# Patient Record
Sex: FEMALE | Race: WHITE | NOT HISPANIC OR LATINO | URBAN - METROPOLITAN AREA
[De-identification: names, ages, dates, MRNs, and addresses within clinical notes are randomized per-mention and may not be internally consistent; named-entity substitution may affect disease eponyms.]

---

## 2017-01-06 ENCOUNTER — OFFICE VISIT - HEALTHEAST (OUTPATIENT)
Dept: INTERNAL MEDICINE | Facility: CLINIC | Age: 62
End: 2017-01-06

## 2017-01-06 DIAGNOSIS — E78.00 PURE HYPERCHOLESTEROLEMIA: ICD-10-CM

## 2017-01-06 DIAGNOSIS — I47.29: ICD-10-CM

## 2017-01-06 DIAGNOSIS — I25.119 ATHEROSCLEROSIS OF CORONARY ARTERY OF NATIVE HEART WITH ANGINA PECTORIS, UNSPECIFIED VESSEL OR LESION TYPE (H): ICD-10-CM

## 2017-01-06 DIAGNOSIS — I10 ESSENTIAL HYPERTENSION WITH GOAL BLOOD PRESSURE LESS THAN 140/90: ICD-10-CM

## 2017-01-06 ASSESSMENT — MIFFLIN-ST. JEOR: SCORE: 1531.99

## 2017-02-06 ENCOUNTER — COMMUNICATION - HEALTHEAST (OUTPATIENT)
Dept: INTERNAL MEDICINE | Facility: CLINIC | Age: 62
End: 2017-02-06

## 2017-02-06 ENCOUNTER — OFFICE VISIT - HEALTHEAST (OUTPATIENT)
Dept: INTERNAL MEDICINE | Facility: CLINIC | Age: 62
End: 2017-02-06

## 2017-02-06 DIAGNOSIS — Z51.81 MEDICATION MONITORING ENCOUNTER: ICD-10-CM

## 2017-02-06 DIAGNOSIS — I10 ESSENTIAL HYPERTENSION WITH GOAL BLOOD PRESSURE LESS THAN 140/90: ICD-10-CM

## 2017-02-06 DIAGNOSIS — R35.0 URINARY FREQUENCY: ICD-10-CM

## 2017-02-06 DIAGNOSIS — I25.119 ATHEROSCLEROSIS OF CORONARY ARTERY OF NATIVE HEART WITH ANGINA PECTORIS, UNSPECIFIED VESSEL OR LESION TYPE (H): ICD-10-CM

## 2017-02-06 ASSESSMENT — MIFFLIN-ST. JEOR: SCORE: 1518.38

## 2017-05-08 ENCOUNTER — OFFICE VISIT - HEALTHEAST (OUTPATIENT)
Dept: INTERNAL MEDICINE | Facility: CLINIC | Age: 62
End: 2017-05-08

## 2017-05-08 ENCOUNTER — COMMUNICATION - HEALTHEAST (OUTPATIENT)
Dept: INTERNAL MEDICINE | Facility: CLINIC | Age: 62
End: 2017-05-08

## 2017-05-08 DIAGNOSIS — I25.119 ATHEROSCLEROSIS OF CORONARY ARTERY OF NATIVE HEART WITH ANGINA PECTORIS, UNSPECIFIED VESSEL OR LESION TYPE (H): ICD-10-CM

## 2017-05-08 DIAGNOSIS — R12 HEARTBURN: ICD-10-CM

## 2017-05-08 DIAGNOSIS — H60.93 OTITIS EXTERNA OF BOTH EARS, UNSPECIFIED CHRONICITY, UNSPECIFIED TYPE: ICD-10-CM

## 2017-05-08 DIAGNOSIS — I10 ESSENTIAL HYPERTENSION WITH GOAL BLOOD PRESSURE LESS THAN 140/90: ICD-10-CM

## 2017-05-08 DIAGNOSIS — I47.29: ICD-10-CM

## 2017-05-08 DIAGNOSIS — Z51.81 MEDICATION MONITORING ENCOUNTER: ICD-10-CM

## 2017-05-08 ASSESSMENT — MIFFLIN-ST. JEOR: SCORE: 1527.45

## 2017-10-09 ENCOUNTER — COMMUNICATION - HEALTHEAST (OUTPATIENT)
Dept: INTERNAL MEDICINE | Facility: CLINIC | Age: 62
End: 2017-10-09

## 2017-10-09 ENCOUNTER — OFFICE VISIT - HEALTHEAST (OUTPATIENT)
Dept: INTERNAL MEDICINE | Facility: CLINIC | Age: 62
End: 2017-10-09

## 2017-10-09 DIAGNOSIS — I25.119 ATHEROSCLEROSIS OF CORONARY ARTERY OF NATIVE HEART WITH ANGINA PECTORIS, UNSPECIFIED VESSEL OR LESION TYPE (H): ICD-10-CM

## 2017-10-09 DIAGNOSIS — Z51.81 MEDICATION MONITORING ENCOUNTER: ICD-10-CM

## 2017-10-09 DIAGNOSIS — R12 HEARTBURN: ICD-10-CM

## 2017-10-09 DIAGNOSIS — I10 ESSENTIAL HYPERTENSION: ICD-10-CM

## 2017-10-09 DIAGNOSIS — M72.2 PLANTAR FASCIITIS: ICD-10-CM

## 2017-10-09 DIAGNOSIS — Z12.31 VISIT FOR SCREENING MAMMOGRAM: ICD-10-CM

## 2017-10-09 DIAGNOSIS — I10 ESSENTIAL HYPERTENSION WITH GOAL BLOOD PRESSURE LESS THAN 140/90: ICD-10-CM

## 2017-10-09 DIAGNOSIS — E78.00 PURE HYPERCHOLESTEROLEMIA: ICD-10-CM

## 2017-10-09 RX ORDER — LISINOPRIL 40 MG/1
40 TABLET ORAL DAILY
Qty: 90 TABLET | Refills: 1 | Status: SHIPPED | OUTPATIENT
Start: 2017-10-09

## 2017-10-09 ASSESSMENT — MIFFLIN-ST. JEOR: SCORE: 1486.63

## 2017-10-30 ENCOUNTER — OFFICE VISIT - HEALTHEAST (OUTPATIENT)
Dept: INTERNAL MEDICINE | Facility: CLINIC | Age: 62
End: 2017-10-30

## 2017-10-30 DIAGNOSIS — K08.89 TOOTHACHE: ICD-10-CM

## 2017-10-30 DIAGNOSIS — I10 ESSENTIAL HYPERTENSION: ICD-10-CM

## 2017-10-30 DIAGNOSIS — Z51.81 MEDICATION MONITORING ENCOUNTER: ICD-10-CM

## 2017-10-30 ASSESSMENT — MIFFLIN-ST. JEOR: SCORE: 1495.7

## 2017-10-31 ENCOUNTER — COMMUNICATION - HEALTHEAST (OUTPATIENT)
Dept: INTERNAL MEDICINE | Facility: CLINIC | Age: 62
End: 2017-10-31

## 2017-11-01 ENCOUNTER — COMMUNICATION - HEALTHEAST (OUTPATIENT)
Dept: ADMINISTRATIVE | Facility: CLINIC | Age: 62
End: 2017-11-01

## 2017-11-16 ENCOUNTER — COMMUNICATION - HEALTHEAST (OUTPATIENT)
Dept: INTERNAL MEDICINE | Facility: CLINIC | Age: 62
End: 2017-11-16

## 2017-11-16 DIAGNOSIS — I10 ESSENTIAL HYPERTENSION WITH GOAL BLOOD PRESSURE LESS THAN 140/90: ICD-10-CM

## 2017-11-16 DIAGNOSIS — I25.119 ATHEROSCLEROSIS OF CORONARY ARTERY OF NATIVE HEART WITH ANGINA PECTORIS, UNSPECIFIED VESSEL OR LESION TYPE (H): ICD-10-CM

## 2017-11-16 RX ORDER — LISINOPRIL 20 MG/1
TABLET ORAL
Qty: 90 TABLET | Refills: 1 | Status: SHIPPED | OUTPATIENT
Start: 2017-11-16

## 2017-11-16 RX ORDER — HYDROCHLOROTHIAZIDE 12.5 MG/1
12.5 TABLET ORAL EVERY OTHER DAY
Qty: 45 TABLET | Refills: 1 | Status: SHIPPED | OUTPATIENT
Start: 2017-11-16

## 2017-11-16 RX ORDER — ATORVASTATIN CALCIUM 20 MG/1
TABLET, FILM COATED ORAL
Qty: 45 TABLET | Refills: 1 | Status: SHIPPED | OUTPATIENT
Start: 2017-11-16

## 2017-12-06 ENCOUNTER — COMMUNICATION - HEALTHEAST (OUTPATIENT)
Dept: FAMILY MEDICINE | Facility: CLINIC | Age: 62
End: 2017-12-06

## 2017-12-06 ENCOUNTER — RECORDS - HEALTHEAST (OUTPATIENT)
Dept: ADMINISTRATIVE | Facility: OTHER | Age: 62
End: 2017-12-06

## 2017-12-07 ENCOUNTER — OFFICE VISIT - HEALTHEAST (OUTPATIENT)
Dept: FAMILY MEDICINE | Facility: CLINIC | Age: 62
End: 2017-12-07

## 2017-12-07 DIAGNOSIS — S52.501A CLOSED FRACTURE OF RIGHT DISTAL RADIUS: ICD-10-CM

## 2017-12-07 DIAGNOSIS — Z01.818 PRE-OP EXAM: ICD-10-CM

## 2017-12-07 LAB
ATRIAL RATE - MUSE: 59 BPM
DIASTOLIC BLOOD PRESSURE - MUSE: NORMAL MMHG
INTERPRETATION ECG - MUSE: NORMAL
P AXIS - MUSE: 21 DEGREES
PR INTERVAL - MUSE: 152 MS
QRS DURATION - MUSE: 84 MS
QT - MUSE: 480 MS
QTC - MUSE: 475 MS
R AXIS - MUSE: 12 DEGREES
SYSTOLIC BLOOD PRESSURE - MUSE: NORMAL MMHG
T AXIS - MUSE: 59 DEGREES
VENTRICULAR RATE- MUSE: 59 BPM

## 2017-12-07 ASSESSMENT — MIFFLIN-ST. JEOR: SCORE: 1482.55

## 2017-12-08 ENCOUNTER — RECORDS - HEALTHEAST (OUTPATIENT)
Dept: ADMINISTRATIVE | Facility: OTHER | Age: 62
End: 2017-12-08

## 2017-12-12 ENCOUNTER — RECORDS - HEALTHEAST (OUTPATIENT)
Dept: ADMINISTRATIVE | Facility: OTHER | Age: 62
End: 2017-12-12

## 2017-12-18 ENCOUNTER — COMMUNICATION - HEALTHEAST (OUTPATIENT)
Dept: FAMILY MEDICINE | Facility: CLINIC | Age: 62
End: 2017-12-18

## 2017-12-18 ENCOUNTER — RECORDS - HEALTHEAST (OUTPATIENT)
Dept: ADMINISTRATIVE | Facility: OTHER | Age: 62
End: 2017-12-18

## 2018-01-08 ENCOUNTER — RECORDS - HEALTHEAST (OUTPATIENT)
Dept: ADMINISTRATIVE | Facility: OTHER | Age: 63
End: 2018-01-08

## 2018-02-05 ENCOUNTER — RECORDS - HEALTHEAST (OUTPATIENT)
Dept: ADMINISTRATIVE | Facility: OTHER | Age: 63
End: 2018-02-05

## 2018-03-28 ENCOUNTER — RECORDS - HEALTHEAST (OUTPATIENT)
Dept: ADMINISTRATIVE | Facility: OTHER | Age: 63
End: 2018-03-28

## 2021-05-28 ENCOUNTER — RECORDS - HEALTHEAST (OUTPATIENT)
Dept: ADMINISTRATIVE | Facility: CLINIC | Age: 66
End: 2021-05-28

## 2021-05-29 ENCOUNTER — RECORDS - HEALTHEAST (OUTPATIENT)
Dept: ADMINISTRATIVE | Facility: CLINIC | Age: 66
End: 2021-05-29

## 2021-05-30 ENCOUNTER — RECORDS - HEALTHEAST (OUTPATIENT)
Dept: ADMINISTRATIVE | Facility: CLINIC | Age: 66
End: 2021-05-30

## 2021-05-30 VITALS — BODY MASS INDEX: 41.59 KG/M2 | HEIGHT: 62 IN | WEIGHT: 226 LBS

## 2021-05-30 VITALS — HEIGHT: 62 IN | WEIGHT: 228 LBS | BODY MASS INDEX: 41.96 KG/M2

## 2021-05-30 VITALS — HEIGHT: 62 IN | WEIGHT: 229 LBS | BODY MASS INDEX: 42.14 KG/M2

## 2021-05-31 ENCOUNTER — RECORDS - HEALTHEAST (OUTPATIENT)
Dept: ADMINISTRATIVE | Facility: CLINIC | Age: 66
End: 2021-05-31

## 2021-05-31 VITALS — BODY MASS INDEX: 40.14 KG/M2 | WEIGHT: 218.1 LBS | HEIGHT: 62 IN

## 2021-05-31 VITALS — HEIGHT: 62 IN | WEIGHT: 219 LBS | BODY MASS INDEX: 40.3 KG/M2

## 2021-05-31 VITALS — WEIGHT: 221 LBS | HEIGHT: 62 IN | BODY MASS INDEX: 40.67 KG/M2

## 2021-06-01 ENCOUNTER — RECORDS - HEALTHEAST (OUTPATIENT)
Dept: ADMINISTRATIVE | Facility: CLINIC | Age: 66
End: 2021-06-01

## 2021-06-03 ENCOUNTER — RECORDS - HEALTHEAST (OUTPATIENT)
Dept: ADMINISTRATIVE | Facility: CLINIC | Age: 66
End: 2021-06-03

## 2021-06-08 NOTE — PROGRESS NOTES
[facility] Follow Up Note    Lucinda Ruano   61 y.o. female    Date of Visit: 1/6/2017    Chief Complaint   Patient presents with     Follow-up     Subjective  Lucinda is here for follow-up of hypertension.  She was supposed to have gotten new lisinopril, she still using the 2 year-old medication.  She did not  the HCTZ 12.5 mg every other day that was supposed to be added in November.    She has not checked her blood pressure.  She denies lightheaded dizzy spells or edema.    Past history of right ventricular outflow ablation for V. tach in 2009 with good results.  Just occasional ectopy now.    She had atypical chest pain episode in October hospitalized and ruled out for an MI.  Saw cardiology and they did not feel he warranted stress testing or intervention at that time.    Cardiac catheterization back in 2008 did show some moderate diffuse disease but no intervention.    She still on the Lipitor 20 mg every other day and an aspirin a day.    She has not had any recurrent chest pain.  She does get some mild palpitations when she is walking and work, but she associates that with work stress.  She does not get the symptoms when walking and other situations.    No lower extremity edema.  No fainting spells.    She's never smoked.  Her blood sugars a bit okay and no family history of diabetes.    She still overweight, does drink sugared pop.  Does not get regular exercise.    Chronic plantar fasciitis, she does wear orthotic shoes, no acute exacerbation currently.    Suspected sleep apnea but she does not wish to do a study, complains of claustrophobia.    She had an episode of left flank pain in November with some blood in the urine.  CT scan showed a nonobstructing left kidney stone.  She does have a history of kidney stones.  She does not want to follow up with urology.  Symptoms resolved shortly thereafter and she has not had recurrent issues.  No current hematuria or flank  "pain.    Cardiac echo October 2016 with an ejection fraction of 55%, mild MR, mild left atrial enlargement, no aortic stenosis    Heartburn controlled on omeprazole.    PMHx:    Past Medical History   Diagnosis Date     Arthritis      CAD (coronary artery disease)      Carpal tunnel syndrome      Gallbladder attack      HTN (hypertension)      Hypercholesteremia      Kidney stones      Ventricular tachycardia      PSHx:    Past Surgical History   Procedure Laterality Date     Ablation of dysrhythmic focus       2 prior ablations for RVOT PVCs and atrial flutter     Cholecystectomy       Immunizations:   Immunization History   Administered Date(s) Administered     Td, historic 10/03/2008     Tdap 10/03/2008       ROS A comprehensive review of systems was performed and was otherwise negative    Medications, allergies, and problem list were reviewed and updated    Exam  Visit Vitals     /82     Pulse (!) 52     Ht 5' 2\" (1.575 m)     Wt (!) 229 lb (103.9 kg)     SpO2 98%     BMI 41.88 kg/m2     Lungs are clear.  Heart is regular without murmur.  No ankle edema.    Assessment/Plan  1. Essential hypertension with goal blood pressure less than 140/90  Not controlled, did not comply with recommendations for HCTZ addition.  She will  the HCTZ 12.5 mg every other day and get a new supply lisinopril 20 mg a day.  Follow-up next month.    2. Right ventricular outflow tract ventricular tachycardia  Occasional palpitations.  Associated with stress with work.  No increased symptoms.    3. Atherosclerosis of coronary artery of native heart with angina pectoris, unspecified vessel or lesion type  No suggestive ischemia events.  Cardiology did not recommend further stress testing last fall.  If she does have new or worsening symptoms, consider Lexiscan stress test.  Continue medical management with aspirin and Lipitor.    4. Hypercholesterolemia  Lipitor.    Due for a comprehensive metabolic profile checked next " visit.    Kidney stone with hematuria in November.  Maintain good hydration.  Symptoms have resolved.  I offered her a urology referral, but she declined at this time.    She refuses health maintenance and immunizations.  She was HPV negative in 2012.  Does have her GYN organs.    Plantar fasciitis stable.  Orthotic shoes and limit climbing on ladders at work.    Heartburn controlled with omeprazole.    Return in about 4 weeks (around 2/3/2017) for Recheck.   There are no Patient Instructions on file for this visit.  Donnell Fox MD    The following high BMI interventions were performed this visit: encouragement to exercise    Current Outpatient Prescriptions   Medication Sig Dispense Refill     aspirin 325 MG EC tablet Take 325 mg by mouth every 8 (eight) hours as needed for pain. pain       atorvastatin (LIPITOR) 20 MG tablet Take 20 mg by mouth every other day. Every OTHER day, cholesterol       lisinopril (PRINIVIL,ZESTRIL) 20 MG tablet Take 1 tablet (20 mg total) by mouth daily. 30 tablet 1     omeprazole (PRILOSEC) 20 MG capsule Take 20 mg by mouth daily before lunch.       hydroCHLOROthiazide (HYDRODIURIL) 12.5 MG tablet Take 12.5 mg by mouth every other day. Every OTHER day, blood pressure       No current facility-administered medications for this visit.      Allergies   Allergen Reactions     Penicillins Other (See Comments)     Family member had reaction, pt herself avoids, her dad with deathly allergic per pt     Social History   Substance Use Topics     Smoking status: Never Smoker     Smokeless tobacco: None     Alcohol use No

## 2021-06-08 NOTE — PROGRESS NOTES
[facility] Follow Up Note    Lucinda Ruano   61 y.o. female    Date of Visit: 2/6/2017    Chief Complaint   Patient presents with     Follow-up     BP follow up     Subjective  Lucinda is here for follow-up on hypertension.  In January her blood pressure was 140/82, but she was still using her old lisinopril, and had not started HCTZ yet.    Now she has new lisinopril 20 mg tablets she takes one daily.  And has started the hydrochlorothiazide 12.5 mg about every third day, because she is not taking it every other day as prescribed.    She does complain of some mild increased urinary frequency on the days that she takes the HCTZ.  Denies dysuria.  Denies fever or sustaining urinary symptoms.  She states she's been feeling this way for about 3 weeks, since she started the HCTZ.    She does have a past history of right kidney stone with hematuria in November 2016.  No flank pain.  She did not want to see urology for further evaluation at that time.    She still does not wish to see urology for evaluation of her urinary symptoms and history of hematuria.  She still feels she is voiding well.  No fever.    She is active at work, but avoiding the ladder that exacerbates her plantar fasciitis.  Still chronic severe knee DJD pain.  Does not take any pain medicine.  She does not wish to see orthopedic surgery at this time for another cortisone shot.    Obese, is 3 pounds down.  Does have a history of drinking pop.  No family history of diabetes and her sugars have been okay.    No increasing shortness of breath or new cough.  She did not smoke.    She has some sleep disturbance at times, did not want to be evaluated for sleep apnea.  Still feels she has restless nights when she thinks about her  passed away a few years ago.  Denies overt depression and denies wanting to see somebody for mental health services.    She does have a past history of coronary artery disease.  Angiogram in 2008 showed  "moderate diffuse disease.  She has not had any cardiac ischemic events or MI.  He is on aspirin and Lipitor.  Cholesterol well controlled last October.    Cardiac echo October 2016 with an ejection fraction 55%, mild mitral regurgitation, mild left atrial enlargement.  No aortic stenosis.  She has not had any lower extremity edema or heart failure symptoms.    She had ablation in 2009 for right ventricular outflow tract ventricular tachycardia.  Occasional PVCs.  They've not increased in frequency.  No fainting spells.  No sustained arrhythmia or tachycardia.  Runs baseline bradycardic.    Patient continues to refuse health maintenance.  She was thinking about getting a mammogram, last mammogram on record is over 2 years old.  She does have her organs in.  HPV negative in 2012.  She has not wanted to see gynecology or undergo Paps.    Heartburn controlled on omeprazole.  No dysphagia.    Lives at home with her son.    PMHx:    Past Medical History:   Diagnosis Date     Arthritis      CAD (coronary artery disease)      Carpal tunnel syndrome      Gallbladder attack      HTN (hypertension)      Hypercholesteremia      Kidney stones      Ventricular tachycardia      PSHx:    Past Surgical History:   Procedure Laterality Date     ABLATION OF DYSRHYTHMIC FOCUS      2 prior ablations for RVOT PVCs and atrial flutter     CHOLECYSTECTOMY       Immunizations:   Immunization History   Administered Date(s) Administered     Td, historic 10/03/2008     Tdap 10/03/2008       ROS A comprehensive review of systems was performed and was otherwise negative    Medications, allergies, and problem list were reviewed and updated    Exam  Visit Vitals     /78     Pulse (!) 56     Ht 5' 2\" (1.575 m)     Wt (!) 226 lb (102.5 kg)     SpO2 98%     BMI 41.34 kg/m2     Alert and oriented ×3 with good mood and affect today.  No jaundice.  Lungs are clear.  Heart is regular without murmur.  No ankle edema.  Gait is within normal limits.  " Abdomen obese, without significant tenderness.    Assessment/Plan  1. Essential hypertension with goal blood pressure less than 140/90  Controlled.  Continue current medications and follow up in 3 months.  I encouraged her to take the HCTZ every other day.  Continue to work on weight loss and exercise.  Continue lisinopril 20 mg a day.    2. Atherosclerosis of coronary artery of native heart with angina pectoris, unspecified vessel or lesion type  She denies new heart symptoms or chest pain.  Could consider Lexiscan stress test in new or changing symptoms.    Continue medical management with aspirin and Lipitor.    October 2016 HDL 92 and LDL 43    History of RV outflow V. tach, no recurrence after ablation.  PVCs stable.    3. Medication monitoring encounter    - Comprehensive Metabolic Panel    4. Urinary frequency  Mild increase with use of HCTZ.  She denies dysuria or fever or worsening symptoms.  I suggested she check a urine today to evaluate for UTI, but she declined.  She promised to return if any worsening symptoms.    History of hematuria with previous kidney stone last year.  She still does not want to see urology.  I again offered her urology referral.    She was given a card for mammogram, but she still declines health maintenance.     Reflux controlled with omeprazole.    Plantar fasciitis, were good shoes.  Limited time upon ladder at work.    Still some daytime somnolence, suspected sleep apnea but refuses study.    Return in about 3 months (around 5/6/2017) for Recheck.   There are no Patient Instructions on file for this visit.  Donnell Fox MD    The following high BMI interventions were performed this visit: encouragement to exercise    Current Outpatient Prescriptions   Medication Sig Dispense Refill     aspirin 325 MG EC tablet Take 325 mg by mouth every 8 (eight) hours as needed for pain. pain       atorvastatin (LIPITOR) 20 MG tablet Take 20 mg by mouth every other day. Every OTHER day,  cholesterol       hydroCHLOROthiazide (HYDRODIURIL) 12.5 MG tablet Take 12.5 mg by mouth every other day. Every OTHER day, blood pressure       lisinopril (PRINIVIL,ZESTRIL) 20 MG tablet Take 1 tablet (20 mg total) by mouth daily. 30 tablet 1     omeprazole (PRILOSEC) 20 MG capsule Take 20 mg by mouth daily before lunch.       No current facility-administered medications for this visit.      Allergies   Allergen Reactions     Penicillins Other (See Comments)     Family member had reaction, pt herself avoids, her dad with deathly allergic per pt     Social History   Substance Use Topics     Smoking status: Never Smoker     Smokeless tobacco: None     Alcohol use No

## 2021-06-09 ENCOUNTER — RECORDS - HEALTHEAST (OUTPATIENT)
Dept: ADMINISTRATIVE | Facility: CLINIC | Age: 66
End: 2021-06-09

## 2021-06-10 NOTE — PROGRESS NOTES
[facility] Follow Up Note    Lucinda Ruano   61 y.o. female    Date of Visit: 5/8/2017    Chief Complaint   Patient presents with     Follow-up     3 mo follow up, fatigued     Subjective  Lucinda is here for follow-up of multiple medical problems.  She has a past history of hypertension and obesity and inactivity.  I suspected sleep apnea, but she still refuses to do a sleep study.  She continues to complain of fatigue.  November 2016 TSH and hemoglobin normal.  She does not much exercise.  She complains of chronic knee pain and plantar fasciitis.  She limits her time up on the ladder at work to reduce her plantar fasciitis pain.    She does not have a regular exercise routine.    She does not have any chest pain or chest pressure.  She has not had any recent major palpitation episodes.  Just occasional premature beats.    She had an ablation in 2009 for RV outflow tract V. tach, just rare PVCs now.  Does tend to run bradycardic but tolerates.  No fainting spells or lightheaded dizzy spells.    Cardiac echo October 2016 showed ejection fraction 55%, mild mitral regurgitation and no aortic stenosis.    Angiogram back in 2008 showed moderate coronary artery disease, but no intervention or severe stenosis.    Continues on Lipitor every other day, but does not always take it as directed.  She is on the aspirin daily.  Her cholesterol is well controlled last October with an HDL of 92 and LDL 43.    She does not check her blood pressure very often, once at work in March it was 120/84.  In February it was 130/78.  She did not take her lisinopril for the last 3 days, but that did remember to take it this morning.  She takes her HCTZ about once a week, it is prescribed at every other day.  Blood pressure is again high today, long history of noncompliance of medication.    No headache complaints and no neurologic changes.    Previous right kidney stone.  No urinary symptoms now.    She has had  "difficulty losing weight, she is up 2 pounds.  She does tend to drink pop.  No family history of diabetes and her sugars have been okay.    Non-smoker.  No new cough or wheezing or increasing shortness of breath.    Her heartburn is controlled on omeprazole, no swallowing difficulty, denies abdominal pain or changes in bowels or blood in stool or melena.    She has her GYN organs in, she does not want to do further health maintenance.  HPV was negative in 2012.  She still has not done her mammogram, she was given another card today.    She has a new complaint of itchy ears, it has been intermittent in the past and she is use some Cortisporin otic in the past.  No ear pain or drainage.    She lives at home with son.    PMHx:    Past Medical History:   Diagnosis Date     Arthritis      CAD (coronary artery disease)      Carpal tunnel syndrome      Gallbladder attack      HTN (hypertension)      Hypercholesteremia      Kidney stones      Ventricular tachycardia      PSHx:    Past Surgical History:   Procedure Laterality Date     ABLATION OF DYSRHYTHMIC FOCUS      2 prior ablations for RVOT PVCs and atrial flutter     CHOLECYSTECTOMY       Immunizations:   Immunization History   Administered Date(s) Administered     Td, historic 10/03/2008     Tdap 10/03/2008       ROS A comprehensive review of systems was performed and was otherwise negative    Medications, allergies, and problem list were reviewed and updated    Exam  /81  Pulse (!) 51  Ht 5' 2\" (1.575 m)  Wt (!) 228 lb (103.4 kg)  SpO2 98%  BMI 41.7 kg/m2  Mild irritated external ears with dry flaky wax, not full otitis externa, however.  Tympanic membranes were normal.  No pharyngitis or thrush.  No JVD and no carotid bruits.  No cervical or supraclavicular adenopathy.  Lungs are clear to auscultation and normal respiratory excursion.  Heart is regular no premature beats, no murmur.  Abdomen is obese, nontender no hepatomegaly.  No ankle " edema.    Assessment/Plan  1. Essential hypertension with goal blood pressure less than 140/90  Not controlled, but not compliant with her medication.  I asked that she take her lisinopril every day, and take her HCTZ on a more regular basis.    I recommended weight loss and more regular exercise routine.  Follow-up this fall.  - lisinopril (PRINIVIL,ZESTRIL) 20 MG tablet; Take 1 tablet (20 mg total) by mouth daily.  Dispense: 90 tablet; Refill: 1  - hydroCHLOROthiazide (HYDRODIURIL) 12.5 MG tablet; Take 1 tablet (12.5 mg total) by mouth every other day. Every OTHER day, blood pressure  Dispense: 45 tablet; Refill: 1    2. Atherosclerosis of coronary artery of native heart with angina pectoris, unspecified vessel or lesion type  Asymptomatic.  Poor exercise tolerance.  Continue medical management with aspirin and Lipitor.  - atorvastatin (LIPITOR) 20 MG tablet; Take 1 tablet (20 mg total) by mouth every other day. Every OTHER day, cholesterol  Dispense: 45 tablet; Refill: 1    3. Heartburn  No symptoms, well-controlled  - omeprazole (PRILOSEC) 20 MG capsule; Take 1 capsule (20 mg total) by mouth daily before lunch.  Dispense: 90 capsule; Refill: 1    4. Right ventricular outflow tract ventricular tachycardia  Rare PVC, post ablation    5. Medication monitoring encounter    - Comprehensive Metabolic Panel    6. Otitis externa of both ears, unspecified chronicity, unspecified type  Mild, I recommended mineral oil on a daily basis, but can use the Cortisporin Otic if significant flare.  - neomycin-polymyxin-hydrocortisone (CORTISPORIN) otic solution; 3 drops in affected ear twice a day as needed for ear itching/pain  Dispense: 10 mL; Refill: 1    She was given a card for mammogram and reminded to set that up.  She does not wish to do other health maintenance.    Chronic fatigue with lack of exercise, obesity and suspected sleep apnea.  She states she would be claustrophobic and would not tolerate the CPAP.  She  therefore again declined the sleep clinic referral.  I recommended improve regular exercise and weight loss.    Chronic knee DJD and plantar fasciitis.  Continue with current work restrictions, avoiding climbing the ladder or being on her feet.  She does need to continue to work on regular exercise and weight loss, however.  Just occasional Excedrin for pain.  Was warned against more regular use of ibuprofen or Aleve, with risk on blood pressure and kidney function.    Return in about 5 months (around 10/8/2017) for Recheck.   There are no Patient Instructions on file for this visit.  Donnell Fox MD  Total time with patient over 25 minutes and over 50% coord care.  Time all face to face.  The following high BMI interventions were performed this visit: encouragement to exercise    Current Outpatient Prescriptions   Medication Sig Dispense Refill     aspirin 325 MG EC tablet Take 325 mg by mouth every 8 (eight) hours as needed for pain. pain       atorvastatin (LIPITOR) 20 MG tablet Take 1 tablet (20 mg total) by mouth every other day. Every OTHER day, cholesterol 45 tablet 1     lisinopril (PRINIVIL,ZESTRIL) 20 MG tablet Take 1 tablet (20 mg total) by mouth daily. 90 tablet 1     hydroCHLOROthiazide (HYDRODIURIL) 12.5 MG tablet Take 1 tablet (12.5 mg total) by mouth every other day. Every OTHER day, blood pressure 45 tablet 1     neomycin-polymyxin-hydrocortisone (CORTISPORIN) otic solution 3 drops in affected ear twice a day as needed for ear itching/pain 10 mL 1     omeprazole (PRILOSEC) 20 MG capsule Take 1 capsule (20 mg total) by mouth daily before lunch. 90 capsule 1     No current facility-administered medications for this visit.      Allergies   Allergen Reactions     Penicillins Other (See Comments)     Family member had reaction, pt herself avoids, her dad with deathly allergic per pt     Social History   Substance Use Topics     Smoking status: Never Smoker     Smokeless tobacco: None     Alcohol use No

## 2021-06-13 NOTE — PROGRESS NOTES
[facility] Follow Up Note    Lucinda Ruano   62 y.o. female    Date of Visit: 10/30/2017    Chief Complaint   Patient presents with     Follow-up     BP follow up, needs note for work, possible infection in tooth     Subjective  Lucinda is here for following up on blood pressure.  She has been noncompliant in the past and had elevated blood pressure last visit at 156/88.  I had her increase the lisinopril to 40 mg a day, she did not want to take her hydrochlorothiazide.  She did tolerate the higher dose but had some mild lightheaded spells the first week.  He temporarily took a lower dose but is been back on the 40 mg dose for the past week.  Denies lightheaded dizzy spells now.    No new cough.  No edema.  No chest pain or increased palpitations.    She does have a past history of ventricular tachycardia with right ventricular outflow, but that largely resolved after ablation just PVCs and rare palpitations now.    Suspected sleep apnea but she has refused evaluation.    She has a new issue with a left lower premolar with soreness.  She is talked to her dentist, but she cannot afford the extraction.  No fevers.  Pain is been hurting her for the past 2 days.  She is requesting doxycycline antibiotic.  He states she tolerated that the past.  Has penicillin allergy.    She is back on her Lipitor, but every other day, does tend to forget it.  No worsening muscle aches or right upper quadrant abdominal pain or jaundice.  Liver tests were normal last check.    No increased reflux.    Patient did want another note for her FMLA, allowing for intermittently for clinic appointments and exacerbation of his of her medical condition.  Note was given to patient.  Work restriction paperwork was done on last visit.    No new abdominal pain.  No new cough.  Taking occasional Excedrin.  She does know that Aleve or Advil can cause kidney injury and affect blood pressure and avoids that.    PMHx:    Past  "Medical History:   Diagnosis Date     Arthritis      CAD (coronary artery disease)      Carpal tunnel syndrome      Gallbladder attack      HTN (hypertension)      Hypercholesteremia      Kidney stones      Ventricular tachycardia      PSHx:    Past Surgical History:   Procedure Laterality Date     ABLATION OF DYSRHYTHMIC FOCUS      2 prior ablations for RVOT PVCs and atrial flutter     CHOLECYSTECTOMY       Immunizations:   Immunization History   Administered Date(s) Administered     Td, historic 10/03/2008     Tdap 10/03/2008       ROS A comprehensive review of systems was performed and was otherwise negative    Medications, allergies, and problem list were reviewed and updated    Exam  /74  Pulse (!) 55  Ht 5' 2\" (1.575 m)  Wt 221 lb (100.2 kg)  SpO2 99%  BMI 40.42 kg/m2  She has some mild tenderness to her left premolar but no abscess just mild tenderness along the jawline and no swelling.  No evidence of purulent discharge.  No thrush.  No other oral lesions.  She is missing a number of her teeth has bad diffuse caries.    Lungs are clear and heart is regular without murmur.  No edema.  Abdomen is obese but nontender.    Assessment/Plan  1. Essential hypertension  Much better control now.  Continue to 40 mg of lisinopril.  Noncompliant with HCTZ and I will not use that at this time.  Follow blood pressure and follow-up in 3-4 months if stable.    2. Medication monitoring encounter    - Basic Metabolic Panel    3. Toothache  Patient was told that she needs to manage this through her dentist.  She states she could not afford to see the dentist at this time he does not want to have the tooth pulled.  I did give her 2 weeks of doxycycline but explained that that was likely a short-term treatment and that she still needs a follow-up with her dentist.    She was warned about ibuprofen and Aleve and other NSAIDs and how they can affect blood pressure and risk of kidney injury.  Tylenol as needed if " needed.  - doxycycline (ADOXA) 100 MG tablet; Take 1 tablet (100 mg total) by mouth 2 (two) times a day for 14 days.  Dispense: 28 tablet; Refill: 0    History of plantar fasciitis, not acting up now.  Wear good shoes at all times.  See previous work restriction paperwork.    Heartburn controlled on omeprazole.    She declines flu shot.    Hypercholesterolemia, continue Lipitor, although she is not remembering it a lot of the time.    She still needs to schedule her screening mammogram.    Return in about 3 months (around 1/30/2018) for Recheck.   Patient Instructions   Doxycycline for 2 weeks for your tooth, but see her dentist if that worsens.    Continue the current lisinopril 40 mg.    Lab work today.    See me in 3-4 months for routine checkup    Donnell Fox MD      Current Outpatient Prescriptions   Medication Sig Dispense Refill     aspirin 325 MG EC tablet Take 325 mg by mouth every 8 (eight) hours as needed for pain. pain       atorvastatin (LIPITOR) 20 MG tablet Take 1 tablet (20 mg total) by mouth every other day. Every OTHER day, cholesterol 45 tablet 1     lisinopril (PRINIVIL,ZESTRIL) 40 MG tablet Take 1 tablet (40 mg total) by mouth daily. 90 tablet 1     neomycin-polymyxin-hydrocortisone (CORTISPORIN) otic solution 3 drops in affected ear twice a day as needed for ear itching/pain 10 mL 1     omeprazole (PRILOSEC) 20 MG capsule Take 1 capsule (20 mg total) by mouth daily before lunch. 90 capsule 43     doxycycline (ADOXA) 100 MG tablet Take 1 tablet (100 mg total) by mouth 2 (two) times a day for 14 days. 28 tablet 0     No current facility-administered medications for this visit.      Allergies   Allergen Reactions     Penicillins Other (See Comments)     Family member had reaction, pt herself avoids, her dad with deathly allergic per pt     Social History   Substance Use Topics     Smoking status: Never Smoker     Smokeless tobacco: Not on file     Alcohol use No

## 2021-06-13 NOTE — PROGRESS NOTES
[facility] Follow Up Note    Lucinda Ruano   62 y.o. female    Date of Visit: 10/9/2017    Chief Complaint   Patient presents with     Follow-up     Bp follow up     Subjective  Lucinda is here for follow-up on hypertension and cardiac condition.    She is a long history of noncompliance of medication.  She again states she has not taken hydrochlorothiazide because she does not like to urinate.  She has a history of irritable bladder.  She is usually taking lisinopril, but not every day.  She denies skipping in the last week.    She does take Excedrin for chronic plantar fasciitis, takes up to 4 day at times.  No epigastric at this time.  She is on omeprazole for history of dyspepsia, more bowel reflux in the morning.    She continues to drink sugared pop, and had some this morning for breakfast.    She is moderately obese with suspected sleep apnea.  She refuses evaluation or consideration of CPAP.  Has chronic fatigue.  Normal TSH and hemoglobin #2016.    She has not checked her blood pressure on her own, but she states she feels it has been high.  It is high today.  No increased edema or shortness of breath.  Weight is down 9 pounds, however.    No chest pain or palpitations.    She had history of V. tach with right ventricular outflow source with ablation in 2009.  She has occasional PVCs, runs bradycardic at baseline.  No syncope.    October 2016 cardiac echo showed ejection fraction 55% with mild mitral regurgitation no aortic stenosis.    No chest pain or chest pressure.  She has moderate coronary artery disease on angiogram back in 2008.  No intervention at that time with medical management plan.  She does take an aspirin a day and no GI bleeding.  But she has not been compliant with her Lipitor, has not been taking it since last visit.    She has a chronic plantar fasciitis and chronic knee pain.  She wanted to continue her work restrictions which is no up on ladders, rest needed,  "limit work to 8 hours a day.  She is on light duty.  Was filled out last year, work restrictions again filled out this year.    Bowels are regular no melena.    Past history of right kidney stone and no new urinary issues.    She continues to refuse maintenance her Pap smear.  Was HPV negative in 2012, no further bleeding.  Last mammogram 2012 was negative.  She refuses colonoscopy or colon cancer screening.    2016 LDL 92 and HDL 43    No new cough and has been a non-smoker.    No family history of diabetes.    PMHx:    Past Medical History:   Diagnosis Date     Arthritis      CAD (coronary artery disease)      Carpal tunnel syndrome      Gallbladder attack      HTN (hypertension)      Hypercholesteremia      Kidney stones      Ventricular tachycardia      PSHx:    Past Surgical History:   Procedure Laterality Date     ABLATION OF DYSRHYTHMIC FOCUS      2 prior ablations for RVOT PVCs and atrial flutter     CHOLECYSTECTOMY       Immunizations:   Immunization History   Administered Date(s) Administered     Td, historic 10/03/2008     Tdap 10/03/2008       ROS A comprehensive review of systems was performed and was otherwise negative    Medications, allergies, and problem list were reviewed and updated    Exam  /88  Pulse (!) 58  Ht 5' 2\" (1.575 m)  Wt 219 lb (99.3 kg)  SpO2 97%  BMI 40.06 kg/m2  Obese female.  No jaundice.  Moderate neck adiposity.  No JVD.  Lungs clear to auscultation.  Heart is regular with no murmur.  Abdomen is nontender no hepatosplenomegaly.  No ankle edema.    Assessment/Plan  1. Essential hypertension  Sub-adequately controlled and noncompliant with HCTZ.  At this point she is not planning on taking that.  Could reconsider in future, however.    Increase lisinopril to 40 mg a day.  Work on diet and exercise as able, but has not been able to make much progress there.    Follow-up in 2-3 weeks.  Patient was warned on risk of kidney injury, high potassiums, low blood pressure and " syncope risk with increase of lisinopril.    She  does not want to get a sleep apnea evaluation.    No recurrence of V. tach since ablation.    2. Visit for screening mammogram  He was reminded to get a mammogram, given card to schedule  - Mammo Screening Bilateral; Future    3. Hypercholesterolemia  Restart Lipitor, least every other day.    4. Atherosclerosis of coronary artery of native heart with angina pectoris, unspecified vessel or lesion type  Aspirin daily.  Asymptomatic.    5. Medication monitoring encounter    - Comprehensive Metabolic Panel  - HM2(CBC w/o Differential)    6. Plantar fasciitis  Chronic, baseline pain.  Work on weight loss and regular stretching.    Work restriction form filled out for patient, scanned into record.     7. Essential hypertension with goal blood pressure less than 140/90  As above  - lisinopril (PRINIVIL,ZESTRIL) 40 MG tablet; Take 1 tablet (40 mg total) by mouth daily.  Dispense: 90 tablet; Refill: 1    8. Heartburn  Controlled  - omeprazole (PRILOSEC) 20 MG capsule; Take 1 capsule (20 mg total) by mouth daily before lunch.  Dispense: 90 capsule; Refill: 43    She declined a flu shot.    Left knee DJD, can follow-up with orthopedic clinic as needed.  Excedrin as needed.    Return in about 3 weeks (around 10/30/2017) for Recheck.   Patient Instructions   Increase lisinopril to 40 mg a day.    Do not take hydrochlorothiazide at this time.    Follow-up in clinic in 2-3 weeks to recheck blood pressure, nonfasting visit.    Lab work today.    Restart atorvastatin, at least every other day.    Schedule screening mammogram    Donnell Fox MD  Total time with patient over 25 minutes and over 50% coord care.  Time all face to face.  The following high BMI interventions were performed this visit: encouragement to exercise and weight loss from baseline weight    Current Outpatient Prescriptions   Medication Sig Dispense Refill     aspirin 325 MG EC tablet Take 325 mg by mouth every  8 (eight) hours as needed for pain. pain       lisinopril (PRINIVIL,ZESTRIL) 40 MG tablet Take 1 tablet (40 mg total) by mouth daily. 90 tablet 1     neomycin-polymyxin-hydrocortisone (CORTISPORIN) otic solution 3 drops in affected ear twice a day as needed for ear itching/pain 10 mL 1     omeprazole (PRILOSEC) 20 MG capsule Take 1 capsule (20 mg total) by mouth daily before lunch. 90 capsule 43     atorvastatin (LIPITOR) 20 MG tablet Take 1 tablet (20 mg total) by mouth every other day. Every OTHER day, cholesterol 45 tablet 1     No current facility-administered medications for this visit.      Allergies   Allergen Reactions     Penicillins Other (See Comments)     Family member had reaction, pt herself avoids, her dad with deathly allergic per pt     Social History   Substance Use Topics     Smoking status: Never Smoker     Smokeless tobacco: Not on file     Alcohol use No

## 2021-06-14 NOTE — PROGRESS NOTES
Assessment/Plan:   1. Closed fracture of right distal radius  2. Pre-op exam  Patient approved for surgery with general or local anesthesia. Postoperative pain to be managed by surgeon during post-operative [employer] timeframe, typically 30-60 days for major surgery, and less for others. Postoperative Care will be managed by Hospital Service. Labs will be done as indicated. Follow up as needed. History of coronary artery disease, last echocardiogram was 2016 with EF of 55%.    - Electrocardiogram Perform and Read:   Sinus bradycardia   Low voltage QRS   Septal infarct (cited on or before 21-AUG-2008)   Cannot rule out Inferior infarct (cited on or before 21-AUG-2008)  - Hemoglobin  - Basic Metabolic Panel    Subjective:   Scheduled Procedure: right distal radial fx  Surgery Date:  12/08/17  Surgery Location:  [place] [address] [phone]  Surgeon:  Dr. Villa Mcintosh    Current Outpatient Prescriptions   Medication Sig Dispense Refill     aspirin 325 MG EC tablet Take 325 mg by mouth every 8 (eight) hours as needed for pain. pain       atorvastatin (LIPITOR) 20 MG tablet TAKE 1 TABLET (20 MG TOTAL) BY MOUTH EVERY OTHER DAY. EVERY OTHER DAY, CHOLESTEROL 45 tablet 1     hydroCHLOROthiazide (HYDRODIURIL) 12.5 MG tablet Take 1 tablet (12.5 mg total) by mouth every other day. 45 tablet 1     HYDROcodone-acetaminophen 5-325 mg per tablet Take 1 tablet by mouth every 6 (six) hours as needed for pain. 12 tablet 0     lisinopril (PRINIVIL,ZESTRIL) 20 MG tablet TAKE 1 TABLET (20 MG TOTAL) BY MOUTH DAILY. 90 tablet 1     lisinopril (PRINIVIL,ZESTRIL) 40 MG tablet Take 1 tablet (40 mg total) by mouth daily. 90 tablet 1     neomycin-polymyxin-hydrocortisone (CORTISPORIN) otic solution 3 drops in affected ear twice a day as needed for ear itching/pain 10 mL 1     omeprazole (PRILOSEC) 20 MG capsule Take 1 capsule (20 mg total) by mouth daily before lunch. 90 capsule 43     No current facility-administered medications for  this visit.        Allergies   Allergen Reactions     Penicillins Other (See Comments)     Family member had reaction, pt herself avoids, her dad with deathly allergic per pt       Immunization History   Administered Date(s) Administered     Td,adult,historic,unspecified 10/03/2008     Tdap 10/03/2008       Patient Active Problem List   Diagnosis     Hypercholesterolemia     Essential hypertension     Coronary Artery Disease     Neck Sprain     Fatigue     Right ventricular outflow tract ventricular tachycardia     Complicated grief     Medication monitoring encounter       Past Medical History:   Diagnosis Date     Arthritis      CAD (coronary artery disease)      Carpal tunnel syndrome      Gallbladder attack      HTN (hypertension)      Hypercholesteremia      Kidney stones      Ventricular tachycardia        Social History     Social History     Marital status:      Spouse name: N/A     Number of children: N/A     Years of education: N/A     Occupational History     Not on file.     Social History Main Topics     Smoking status: Never Smoker     Smokeless tobacco: Not on file     Alcohol use No     Drug use: No     Sexual activity: Not on file     Other Topics Concern     Not on file     Social History Narrative    10/14/16: The patient's  passed away in 2013. She works at [employer]).       Past Surgical History:   Procedure Laterality Date     ABLATION OF DYSRHYTHMIC FOCUS      2 prior ablations for RVOT PVCs and atrial flutter     CHOLECYSTECTOMY         History of Present Illness  Lucinda Ruano is a 62 y.o. female  with history of hypercholesterolemia, essential hypertension well controlled, coronary artery disease, and right ventricular outflow tract ventricular tachycardia controlled who presents to the clinic for pre-op examination of right radios surgery. The patient states that this morning she slipped on the ice at the end of her driveway and landed on her R wrist. The patient states she  "knew something was wrong because it \"felt like she had a watch on\" and \"it shouldn't look like that.\" She was unable to drive and had her son take her to the ED for further evaluation. Currently she complains of R wrist pain noting it looks swollen and states her fingers are slightly numb. The patient is R handed. She denies any new knee, hip or elbow pain and head injury. She denies any previous injuries to the area. She has NKDA but tries to stay away from penicillin. The patient has no other medical complaints at this time.    Recent Health  Fever: no  Chills: no  Fatigue: no  Chest Pain: no  Cough: no  Dyspnea: no  Urinary Frequency: no  Nausea: no  Vomiting: no  Diarrhea: no  Abdominal Pain: no  Easy Bruising: yes  Lower Extremity Swelling: no  Poor Exercise Tolerance: no    Most recent Health Maintenance Visit:  2015 ago    Pertinent History  Prior Anesthesia: yes  Previous Anesthesia Reaction:  no  Diabetes: no  Cardiovascular Disease: yes  Pulmonary Disease: no  Renal Disease: no  GI Disease: no  Sleep Apnea: no  Thromboembolic Problems: no  Clotting Disorder: no  Bleeding Disorder: no  Transfusion Reaction: no  Impaired Immunity: no  Steroid use in the last 6 months: no  Frequent Aspirin use: yes    Family history of Father had stroke at 69-    Social history of patient does not wear denture or partial plates    After surgery, the patient plans to recover at home with family.    Review of Systems  A 12 point comprehensive review of systems was negative except as noted.        Objective:         Vitals:    12/07/17 1031   BP: 132/78   Pulse: 70   SpO2: 98%   Weight: 218 lb 1.6 oz (98.9 kg)   Height: 5' 2\" (1.575 m)     Physical Exam:  General appearance - alert, well appearing, and in no distress  Mental status - alert, oriented to person, place, and time  Eyes - pupils equal and reactive, extraocular eye movements intact  Ears - bilateral TM's and external ear canals normal  Nose - normal and patent, no " erythema, discharge or polyps  Mouth - mucous membranes moist, pharynx normal without lesions  Neck - supple, no significant adenopathy  Lymphatics - no palpable lymphadenopathy, no hepatosplenomegaly  Chest - clear to auscultation, no wheezes, rales or rhonchi, symmetric air entry  Heart - normal rate, regular rhythm, normal S1, S2, no murmurs, rubs, clicks or gallops  Abdomen - soft, nontender, nondistended, no masses or organomegaly  Neurological - alert, oriented, normal speech, no focal findings or movement disorder noted  Musculoskeletal - right arm wrapped in a cast  Extremities - peripheral pulses normal, no pedal edema, no clubbing or cyanosis  Skin - normal coloration and turgor, no rashes, no suspicious skin lesions noted

## 2021-06-15 PROBLEM — Z51.81 MEDICATION MONITORING ENCOUNTER: Status: ACTIVE | Noted: 2017-02-06
